# Patient Record
Sex: MALE | Race: WHITE | Employment: UNEMPLOYED | URBAN - METROPOLITAN AREA
[De-identification: names, ages, dates, MRNs, and addresses within clinical notes are randomized per-mention and may not be internally consistent; named-entity substitution may affect disease eponyms.]

---

## 2018-11-15 ENCOUNTER — OFFICE VISIT (OUTPATIENT)
Dept: FAMILY MEDICINE CLINIC | Age: 43
End: 2018-11-15
Payer: COMMERCIAL

## 2018-11-15 VITALS
DIASTOLIC BLOOD PRESSURE: 82 MMHG | HEIGHT: 71 IN | HEART RATE: 75 BPM | WEIGHT: 166 LBS | BODY MASS INDEX: 23.24 KG/M2 | OXYGEN SATURATION: 99 % | SYSTOLIC BLOOD PRESSURE: 129 MMHG

## 2018-11-15 DIAGNOSIS — Z86.19 H/O COLD SORES: ICD-10-CM

## 2018-11-15 DIAGNOSIS — Z00.00 ROUTINE ADULT HEALTH MAINTENANCE: Primary | ICD-10-CM

## 2018-11-15 PROCEDURE — 99386 PREV VISIT NEW AGE 40-64: CPT | Performed by: FAMILY MEDICINE

## 2018-11-15 RX ORDER — VALACYCLOVIR HYDROCHLORIDE 1 G/1
TABLET, FILM COATED ORAL
Qty: 4 TABLET | Refills: 0 | Status: SHIPPED | OUTPATIENT
Start: 2018-11-15 | End: 2021-10-25 | Stop reason: SDUPTHER

## 2018-11-15 RX ORDER — ACYCLOVIR 400 MG/1
TABLET ORAL
Refills: 0 | COMMUNITY
Start: 2018-10-31 | End: 2021-10-25

## 2018-11-15 ASSESSMENT — PATIENT HEALTH QUESTIONNAIRE - PHQ9
SUM OF ALL RESPONSES TO PHQ9 QUESTIONS 1 & 2: 0
2. FEELING DOWN, DEPRESSED OR HOPELESS: 0
SUM OF ALL RESPONSES TO PHQ QUESTIONS 1-9: 0
SUM OF ALL RESPONSES TO PHQ QUESTIONS 1-9: 0
1. LITTLE INTEREST OR PLEASURE IN DOING THINGS: 0

## 2018-11-20 ENCOUNTER — NURSE ONLY (OUTPATIENT)
Dept: FAMILY MEDICINE CLINIC | Age: 43
End: 2018-11-20

## 2018-11-20 DIAGNOSIS — Z00.00 ROUTINE ADULT HEALTH MAINTENANCE: Primary | ICD-10-CM

## 2018-11-20 DIAGNOSIS — Z00.00 ROUTINE ADULT HEALTH MAINTENANCE: ICD-10-CM

## 2018-11-20 LAB
ANION GAP SERPL CALCULATED.3IONS-SCNC: 10 MMOL/L (ref 3–16)
BASOPHILS ABSOLUTE: 0 K/UL (ref 0–0.2)
BASOPHILS RELATIVE PERCENT: 1 %
BUN BLDV-MCNC: 14 MG/DL (ref 7–20)
CALCIUM SERPL-MCNC: 9.8 MG/DL (ref 8.3–10.6)
CHLORIDE BLD-SCNC: 99 MMOL/L (ref 99–110)
CHOLESTEROL, TOTAL: 159 MG/DL (ref 0–199)
CO2: 29 MMOL/L (ref 21–32)
CREAT SERPL-MCNC: 1 MG/DL (ref 0.9–1.3)
EOSINOPHILS ABSOLUTE: 0.3 K/UL (ref 0–0.6)
EOSINOPHILS RELATIVE PERCENT: 6.2 %
GFR AFRICAN AMERICAN: >60
GFR NON-AFRICAN AMERICAN: >60
GLUCOSE BLD-MCNC: 95 MG/DL (ref 70–99)
HCT VFR BLD CALC: 45.2 % (ref 40.5–52.5)
HDLC SERPL-MCNC: 64 MG/DL (ref 40–60)
HEMOGLOBIN: 14.8 G/DL (ref 13.5–17.5)
LDL CHOLESTEROL CALCULATED: 65 MG/DL
LYMPHOCYTES ABSOLUTE: 1.6 K/UL (ref 1–5.1)
LYMPHOCYTES RELATIVE PERCENT: 33.3 %
MCH RBC QN AUTO: 28.8 PG (ref 26–34)
MCHC RBC AUTO-ENTMCNC: 32.7 G/DL (ref 31–36)
MCV RBC AUTO: 88.2 FL (ref 80–100)
MONOCYTES ABSOLUTE: 0.5 K/UL (ref 0–1.3)
MONOCYTES RELATIVE PERCENT: 9.8 %
NEUTROPHILS ABSOLUTE: 2.5 K/UL (ref 1.7–7.7)
NEUTROPHILS RELATIVE PERCENT: 49.7 %
PDW BLD-RTO: 15.2 % (ref 12.4–15.4)
PLATELET # BLD: 191 K/UL (ref 135–450)
PMV BLD AUTO: 8.4 FL (ref 5–10.5)
POTASSIUM SERPL-SCNC: 5 MMOL/L (ref 3.5–5.1)
RBC # BLD: 5.12 M/UL (ref 4.2–5.9)
SODIUM BLD-SCNC: 138 MMOL/L (ref 136–145)
TRIGL SERPL-MCNC: 148 MG/DL (ref 0–150)
VLDLC SERPL CALC-MCNC: 30 MG/DL
WBC # BLD: 5 K/UL (ref 4–11)

## 2018-11-20 PROCEDURE — 36415 COLL VENOUS BLD VENIPUNCTURE: CPT | Performed by: FAMILY MEDICINE

## 2018-11-21 LAB
ESTIMATED AVERAGE GLUCOSE: 114 MG/DL
HBA1C MFR BLD: 5.6 %

## 2021-10-25 ENCOUNTER — OFFICE VISIT (OUTPATIENT)
Dept: PRIMARY CARE CLINIC | Age: 46
End: 2021-10-25
Payer: COMMERCIAL

## 2021-10-25 ENCOUNTER — HOSPITAL ENCOUNTER (OUTPATIENT)
Age: 46
Discharge: HOME | End: 2021-10-25
Payer: COMMERCIAL

## 2021-10-25 VITALS
SYSTOLIC BLOOD PRESSURE: 124 MMHG | HEART RATE: 86 BPM | WEIGHT: 171.6 LBS | OXYGEN SATURATION: 98 % | TEMPERATURE: 97.8 F | HEIGHT: 71 IN | BODY MASS INDEX: 24.02 KG/M2 | DIASTOLIC BLOOD PRESSURE: 86 MMHG

## 2021-10-25 DIAGNOSIS — Z72.0 TOBACCO ABUSE: ICD-10-CM

## 2021-10-25 DIAGNOSIS — Z11.1 VISIT FOR TB SKIN TEST: ICD-10-CM

## 2021-10-25 DIAGNOSIS — Z76.89 ENCOUNTER TO ESTABLISH CARE: ICD-10-CM

## 2021-10-25 DIAGNOSIS — Z11.4 ENCOUNTER FOR SCREENING FOR HIV: ICD-10-CM

## 2021-10-25 DIAGNOSIS — Z00.00 ANNUAL PHYSICAL EXAM: Primary | ICD-10-CM

## 2021-10-25 DIAGNOSIS — Z13.1 DIABETES MELLITUS SCREENING: ICD-10-CM

## 2021-10-25 DIAGNOSIS — Z23 NEED FOR INFLUENZA VACCINATION: ICD-10-CM

## 2021-10-25 DIAGNOSIS — Z13.220 LIPID SCREENING: ICD-10-CM

## 2021-10-25 DIAGNOSIS — Z11.59 NEED FOR HEPATITIS C SCREENING TEST: ICD-10-CM

## 2021-10-25 DIAGNOSIS — Z12.11 COLON CANCER SCREENING: ICD-10-CM

## 2021-10-25 DIAGNOSIS — Z86.19 H/O COLD SORES: ICD-10-CM

## 2021-10-25 DIAGNOSIS — M25.512 LOCALIZED PAIN OF LEFT SHOULDER JOINT: ICD-10-CM

## 2021-10-25 LAB
A/G RATIO: 1.8 (ref 1.1–2.2)
ALBUMIN SERPL-MCNC: 5.1 G/DL (ref 3.4–5)
ALP BLD-CCNC: 59 U/L (ref 40–129)
ALT SERPL-CCNC: 24 U/L (ref 10–40)
ANION GAP SERPL CALCULATED.3IONS-SCNC: 14 MMOL/L (ref 3–16)
AST SERPL-CCNC: 22 U/L (ref 15–37)
BILIRUB SERPL-MCNC: 0.8 MG/DL (ref 0–1)
BUN BLDV-MCNC: 12 MG/DL (ref 7–20)
CALCIUM SERPL-MCNC: 9.7 MG/DL (ref 8.3–10.6)
CHLORIDE BLD-SCNC: 101 MMOL/L (ref 99–110)
CHOLESTEROL, TOTAL: 186 MG/DL (ref 0–199)
CO2: 26 MMOL/L (ref 21–32)
CREAT SERPL-MCNC: 0.8 MG/DL (ref 0.9–1.3)
GFR AFRICAN AMERICAN: >60
GFR NON-AFRICAN AMERICAN: >60
GLOBULIN: 2.9 G/DL
GLUCOSE BLD-MCNC: 94 MG/DL (ref 70–99)
HDLC SERPL-MCNC: 65 MG/DL (ref 40–60)
HEPATITIS C ANTIBODY INTERPRETATION: NORMAL
LDL CHOLESTEROL CALCULATED: 79 MG/DL
POTASSIUM SERPL-SCNC: 4.8 MMOL/L (ref 3.5–5.1)
SODIUM BLD-SCNC: 141 MMOL/L (ref 136–145)
TOTAL PROTEIN: 8 G/DL (ref 6.4–8.2)
TRIGL SERPL-MCNC: 211 MG/DL (ref 0–150)
VLDLC SERPL CALC-MCNC: 42 MG/DL

## 2021-10-25 PROCEDURE — 86803 HEPATITIS C AB TEST: CPT

## 2021-10-25 PROCEDURE — 90674 CCIIV4 VAC NO PRSV 0.5 ML IM: CPT | Performed by: FAMILY MEDICINE

## 2021-10-25 PROCEDURE — 90471 IMMUNIZATION ADMIN: CPT | Performed by: FAMILY MEDICINE

## 2021-10-25 PROCEDURE — 86702 HIV-2 ANTIBODY: CPT

## 2021-10-25 PROCEDURE — 86701 HIV-1ANTIBODY: CPT

## 2021-10-25 PROCEDURE — 36415 COLL VENOUS BLD VENIPUNCTURE: CPT

## 2021-10-25 PROCEDURE — 86580 TB INTRADERMAL TEST: CPT | Performed by: FAMILY MEDICINE

## 2021-10-25 PROCEDURE — 83036 HEMOGLOBIN GLYCOSYLATED A1C: CPT

## 2021-10-25 PROCEDURE — 99386 PREV VISIT NEW AGE 40-64: CPT | Performed by: FAMILY MEDICINE

## 2021-10-25 PROCEDURE — 80053 COMPREHEN METABOLIC PANEL: CPT

## 2021-10-25 PROCEDURE — 80061 LIPID PANEL: CPT

## 2021-10-25 PROCEDURE — 87390 HIV-1 AG IA: CPT

## 2021-10-25 RX ORDER — VALACYCLOVIR HYDROCHLORIDE 1 G/1
TABLET, FILM COATED ORAL
Qty: 4 TABLET | Refills: 5 | Status: SHIPPED | OUTPATIENT
Start: 2021-10-25 | End: 2021-12-17 | Stop reason: SDUPTHER

## 2021-10-25 ASSESSMENT — ENCOUNTER SYMPTOMS
EYE DISCHARGE: 0
BACK PAIN: 0
ABDOMINAL PAIN: 0
CONSTIPATION: 0
SHORTNESS OF BREATH: 0
EYE PAIN: 0
SORE THROAT: 0
DIARRHEA: 0
RHINORRHEA: 0
BLOOD IN STOOL: 1
COLOR CHANGE: 0
WHEEZING: 0

## 2021-10-25 ASSESSMENT — PATIENT HEALTH QUESTIONNAIRE - PHQ9
SUM OF ALL RESPONSES TO PHQ9 QUESTIONS 1 & 2: 0
SUM OF ALL RESPONSES TO PHQ QUESTIONS 1-9: 0
2. FEELING DOWN, DEPRESSED OR HOPELESS: 0
SUM OF ALL RESPONSES TO PHQ QUESTIONS 1-9: 0
SUM OF ALL RESPONSES TO PHQ QUESTIONS 1-9: 0
1. LITTLE INTEREST OR PLEASURE IN DOING THINGS: 0

## 2021-10-25 NOTE — PROGRESS NOTES
Maricarmen Yepez     1975    Consultants:    Patient Care Team:  Randee Carlos, DO as PCP - General (Family Medicine)    Chief Complaint:  Chief Complaint   Patient presents with   [address]    Annual Exam    Mouth Lesions    Shoulder Pain    Nicotine Dependence    Immunizations     flu shot today, con    Colon Cancer Screening    Health Maintenance     -HPI:  Maricarmen Yepez is a 40 y.o. male  established patient who presents for [facility] care, annual exam, cold sores, shoulder pain, tobacco abuse:    -HSV1/Cold sores: takes valtrex if gets one, may get once a year    -Shoulder pain:  Bilateral but left sarai than right  Progressed over time  Located in posterior shoulder  Worse with overhead movement  Does not like to take RX medication  Avoids overhead activity  No radicular symptoms  Worse with internal rotation at times  No paresthesias  Mild to moderate severity  No weakness of upper extremity or  strength  No neck pain    -Tobacco abuse:  Restarted smoking in past few years; had quit about     Social History     Tobacco Use   Smoking Status Current Every Day Smoker    Packs/day: 0.50    Years: 20.00    Pack years: 10.00    Types: Cigarettes   Smokeless Tobacco Never Used     -HM:   Flu shot today  Discuss PCV23 in future  CRC screening referred today    Patient Active Problem List   Diagnosis    H/O cold sores     Past Medical History:    No past medical history on file. Past Surgical History:  No past surgical history on file. Home Meds:  Prior to Visit Medications    Medication Sig Taking? Authorizing Provider   acyclovir (ZOVIRAX) 400 MG tablet TK 1 T PO QID FOR 5 DAYS Yes Historical Provider, MD   valACYclovir (VALTREX) 1 g tablet 2 g twice daily for one day Yes Tim Ch MD       Allergies:  Patient has no known allergies. Family History:    No family history on file.     Social History  Social History     Socioeconomic History    Marital status:      Spouse name: Not on file    Number of children: Not on file    Years of education: Not on file    Highest education level: Not on file   Occupational History    Not on file   Tobacco Use    Smoking status: Former Smoker     Packs/day: 1.00     Years: 15.00     Pack years: 15.00     Types: Cigarettes     Quit date: 3/8/2007     Years since quittin.6    Smokeless tobacco: Never Used   Substance and Sexual Activity    Alcohol use: Not on file    Drug use: Not on file    Sexual activity: Not on file   Other Topics Concern    Not on file   Social History Narrative    Not on file     Social Determinants of Health     Financial Resource Strain:     Difficulty of Paying Living Expenses:    Food Insecurity:     Worried About Running Out of Food in the Last Year:     [address] in the Last Year:    Transportation Needs:     Lack of Transportation (Medical):      Lack of Transportation (Non-Medical):    Physical Activity:     Days of Exercise per Week:     Minutes of Exercise per Session:    Stress:     Feeling of Stress :    Social Connections:     Frequency of Communication with Friends and Family:     Frequency of Social Gatherings with Friends and Family:     Attends L[employer] Services:     Active Member of Clubs or Organizations:     Attends Club or Organization Meetings:     Marital Status:    Intimate Partner Violence:     Fear of Current or Ex-Partner:     Emotionally Abused:     Physically Abused:     Sexually Abused:          Health Maintenance Completed:  Health Maintenance   Topic Date Due    Hepatitis C screen  Never done    HIV screen  Never done    Colon cancer screen colonoscopy  Never done    Flu vaccine (1) 2021    Lipid screen  2023    DTaP/Tdap/Td vaccine (2 - Td or Tdap) 2025    COVID-19 Vaccine  Completed    Hepatitis A vaccine  Aged Out    Hepatitis B vaccine  Aged Out    Hib vaccine  Aged Out    Meningococcal (ACWY) vaccine Last 3 Encounters:   10/25/21 124/86   11/15/18 129/82   02/14/13 110/70     Physical Exam  Constitutional:       General: He is not in acute distress. Appearance: He is well-developed. HENT:      Head: Normocephalic and atraumatic. Right Ear: Tympanic membrane normal.      Left Ear: Tympanic membrane normal.      Nose: Nose normal. No rhinorrhea. Mouth/Throat:      Pharynx: Uvula midline. Eyes:      Pupils: Pupils are equal, round, and reactive to light. Neck:      Trachea: No tracheal deviation. Cardiovascular:      Rate and Rhythm: Normal rate and regular rhythm. Heart sounds: Normal heart sounds. No murmur heard. No friction rub. No gallop. Pulmonary:      Effort: Pulmonary effort is normal. No respiratory distress. Breath sounds: Normal breath sounds. No wheezing or rales. Abdominal:      General: Bowel sounds are normal. There is no distension. Palpations: Abdomen is soft. Tenderness: There is no abdominal tenderness. There is no rebound. Musculoskeletal:         General: No tenderness. Normal range of motion. Lymphadenopathy:      Cervical: No cervical adenopathy. Skin:     General: Skin is warm and dry. Findings: No erythema or rash. Neurological:      Mental Status: He is alert and oriented to person, place, and time. Cranial Nerves: No cranial nerve deficit. Deep Tendon Reflexes:      Reflex Scores:       Patellar reflexes are 2+ on the right side and 2+ on the left side. Psychiatric:         Speech: Speech normal.         Thought Content: Thought content does not include homicidal or suicidal ideation.         Shoulder Examination:  Inspection:  No rash, bruising, or skin change  Palpation:  Non tender to palpation  Range of Motion: -Flexion  90, extension 45, abduction 180, adduction 45, external rotation 45, , internal rotation 55 to L4 belt line pain elicited posteriorly  Strength:  Intact 5/5  Special Tests:   Provocative Test Positive Negative Not indicated   Cross Arm Adduction Test [] [x] []   Neer Sign [] [x] []   Caal Impingement Sign [] [x] []   Yergason test [] [x] []   OViniciuss test [] [x] []     Lab Review:  Lab Results   Component Value Date     11/20/2018    K 5.0 11/20/2018    CL 99 11/20/2018    CO2 29 11/20/2018    BUN 14 11/20/2018    CREATININE 1.0 11/20/2018    GLUCOSE 95 11/20/2018    CALCIUM 9.8 11/20/2018    LABGLOM >60 11/20/2018    GFRAA >60 11/20/2018       Lab Results   Component Value Date    CHOL 159 11/20/2018     Lab Results   Component Value Date    TRIG 148 11/20/2018     Lab Results   Component Value Date    HDL 64 (H) 11/20/2018     Lab Results   Component Value Date    LDLCALC 65 11/20/2018     Lab Results   Component Value Date    LABVLDL 30 11/20/2018     Lab Results   Component Value Date    LABA1C 5.6 11/20/2018     Lab Results   Component Value Date    .0 11/20/2018          Assessment/Plan:  Deepti Leblanc is 41 y/o male who  was seen today for establish care, annual exam, mouth lesions, shoulder pain, nicotine dependence, immunizations, colon cancer screening and health maintenance. 1. Encounter to establish care  2. Annual physical exam  -Chart/records reviewed, history and physical performed, health maintenance addressed and updated, presenting problems addressed. -Recommend 150 minutes of cardiovascular activity a week, or 10,000 to 15,000 steps a day, 2 days of weightbearing  -avoid processed/refined carbohydrates (boxed/canned/frozen/fast)  -encourage healthy protein and fat, butter, avocado, egg    3. Localized pain of left shoulder joint  -not controlled  -unclear etiology; impingement vs rotator cuff tear/arthropathy vs subacromial bursitis vs labral tear vs biceps tendonitis vs cervical radiculopathy  -exam more consistent with impingement/rotator cuff pathology  -home exercises given; consider CSI, formal PT, sports med follow up if persists    4.  H/O cold sores  -well controlled; gets rarely takes valtrex PRN on demand when has cold sore  - valACYclovir (VALTREX) 1 g tablet; 2 g twice daily for one day  Dispense: 4 tablet; Refill: 5    5. Diabetes mellitus screening  - Comprehensive Metabolic Panel; Future  - HEMOGLOBIN A1C; Future    6. Lipid screening  - Lipid Panel; Future    7. Need for hepatitis C screening test  - HEPATITIS C ANTIBODY; Future    8. Encounter for screening for HIV  - HIV Screen; Future    9. Visit for TB skin test  - Mantoux testing    10. Colon cancer screening  -referred today, discussed need for CRC screening starting at age 40 y/o  - [facility] - Darlene Fernando MD, Gastroenterology, [facility]    11. Need for influenza vaccination  - INFLUENZA, MDCK QUADV, 2 YRS AND OLDER, IM, PF, PREFILL SYR OR SDV, 0.5ML (FLUCELVAX QUADV, PF)    12. Tobacco Abuse:  Encouraged cessation. Discussed with patient treatment options, and programs to help pt quit. Pt verbalizes understanding, aware of risks of persistent tobacco usage. Health Maintenance Due:  Health Maintenance Due   Topic Date Due    Hepatitis C screen  Never done    HIV screen  Never done    Colon cancer screen colonoscopy  Never done    Flu vaccine (1) 09/01/2021      Health Maintenance:  CRC/Colonoscopy Screening: referred today  HIV Screen: (12-76 yr old, and all pregnant patients): drawn today  Hep C Screen: (18-79 yr old): drawn today  Immunizations:  Flu shot today  Consider PCV23 in future, flagged after patient admitted to tobacco abuse    Return in about 6 weeks (around 12/6/2021), or if symptoms worsen or fail to improve. EMR Dragon/transcription disclaimer:  Much of this encounter note is electronic transcription/translation of spoken language to printed texts. The electronic translation of spoken language may be erroneous, or at times, nonsensical words or phrases may be inadvertently transcribed.   Although I have reviewed the note for such errors, some may still exist.       Ellyn Yanez.  Neymar Freeman., DO  10/25/2021

## 2021-10-25 NOTE — PATIENT INSTRUCTIONS
-Get colonoscopy    -Get flu shot today    -Get tb skin test    -Recommend 150 minutes of cardiovascular activity a week, or 10,000 to 15,000 steps a day, 2 days of weightbearing  -dietary wise, try to stick to your weight x 10 in calories a day  -avoid processed/refined carbohydrates (boxed/canned/frozen/fast)  -encourage healthy protein and fat, butter, avocado, egg    -Do home exercises for shoulder    -Follow up with sports medicine if symptoms worsen or fail to improve    Patient Education        Rotator Cuff: Exercises  Introduction  Here are some examples of exercises for you to try. The exercises may be suggested for a condition or for rehabilitation. Start each exercise slowly. Ease off the exercises if you start to have pain. You will be told when to start these exercises and which ones will work best for you. How to do the exercises  Pendulum swing    If you have pain in your back, do not do this exercise. 1. Hold on to a table or the back of a chair with your good arm. Then bend forward a little and let your sore arm hang straight down. This exercise does not use the arm muscles. Rather, use your legs and your hips to create movement that makes your arm swing freely. 2. Use the movement from your hips and legs to guide the slightly swinging arm back and forth like a pendulum (or elephant trunk). Then guide it in circles that start small (about the size of a dinner plate). Make the circles a bit larger each day, as your pain allows. 3. Do this exercise for 5 minutes, 5 to 7 times each day. 4. As you have less pain, try bending over a little farther to do this exercise. This will increase the amount of movement at your shoulder. Posterior stretching exercise    1. Hold the elbow of your injured arm with your other hand. 2. Use your hand to pull your injured arm gently up and across your body. You will feel a gentle stretch across the back of your injured shoulder.   3. Hold for at least 15 to 30 seconds. Then slowly lower your arm. 4. Repeat 2 to 4 times. Up-the-back stretch    Your doctor or physical therapist may want you to wait to do this stretch until you have regained most of your range of motion and strength. You can do this stretch in different ways. Hold any of these stretches for at least 15 to 30 seconds. Repeat them 2 to 4 times. 1. Light stretch: Put your hand in your back pocket. Let it rest there to stretch your shoulder. 2. Moderate stretch: With your other hand, hold your injured arm (palm outward) behind your back by the wrist. Pull your arm up gently to stretch your shoulder. 3. Advanced stretch: Put a towel over your other shoulder. Put the hand of your injured arm behind your back. Now hold the back end of the towel. With the other hand, hold the front end of the towel in front of your body. Pull gently on the front end of the towel. This will bring your hand farther up your back to stretch your shoulder. Overhead stretch    1. Standing about an arm's length away, grasp onto a solid surface. You could use a countertop, a doorknob, or the back of a sturdy chair. 2. With your knees slightly bent, bend forward with your arms straight. Lower your upper body, and let your shoulders stretch. 3. As your shoulders are able to stretch farther, you may need to take a step or two backward. 4. Hold for at least 15 to 30 seconds. Then stand up and relax. If you had stepped back during your stretch, step forward so you can keep your hands on the solid surface. 5. Repeat 2 to 4 times. Shoulder flexion (lying down)    To make a wand for this exercise, use a piece of PVC pipe or a broom handle with the broom removed. Make the wand about a foot wider than your shoulders. 1. Lie on your back, holding a wand with both hands. Your palms should face down as you hold the wand. 2. Keeping your elbows straight, slowly raise your arms over your head.  Raise them until you feel a stretch in your shoulders, upper back, and chest.  3. Hold for 15 to 30 seconds. 4. Repeat 2 to 4 times. Shoulder rotation (lying down)    To make a wand for this exercise, use a piece of PVC pipe or a broom handle with the broom removed. Make the wand about a foot wider than your shoulders. 1. Lie on your back. Hold a wand with both hands with your elbows bent and palms up. 2. Keep your elbows close to your body, and move the wand across your body toward the sore arm. 3. Hold for 8 to 12 seconds. 4. Repeat 2 to 4 times. Wall climbing (to the side)    Avoid any movement that is straight to your side, and be careful not to arch your back. Your arm should stay about 30 degrees to the front of your side. 1. Stand with your side to a wall so that your fingers can just touch it at an angle about 30 degrees toward the front of your body. 2. Walk the fingers of your injured arm up the wall as high as pain permits. Try not to shrug your shoulder up toward your ear as you move your arm up. 3. Hold that position for a count of at least 15 to 20.  4. Walk your fingers back down to the starting position. 5. Repeat at least 2 to 4 times. Try to reach higher each time. Wall climbing (to the front)    During this stretching exercise, be careful not to arch your back. 1. Face a wall, and stand so your fingers can just touch it. 2. Keeping your shoulder down, walk the fingers of your injured arm up the wall as high as pain permits. (Don't shrug your shoulder up toward your ear.)  3. Hold your arm in that position for at least 15 to 30 seconds. 4. Slowly walk your fingers back down to where you started. 5. Repeat at least 2 to 4 times. Try to reach higher each time. Shoulder blade squeeze    1. Stand with your arms at your sides, and squeeze your shoulder blades together. Do not raise your shoulders up as you squeeze. 2. Hold 6 seconds. 3. Repeat 8 to 12 times. Scapular exercise: Arm reach    1. Lie flat on your back.  This exercise: Wall push-ups    This exercise is best done with your fingers somewhat turned out, rather than straight up and down. 1. Stand facing a wall, about 12 inches to 18 inches away. 2. Place your hands on the wall at shoulder height. 3. Slowly bend your elbows and bring your face to the wall. Keep your back and hips straight. 4. Push back to where you started. 5. Repeat 8 to 12 times. 6. When you can do this exercise against a wall comfortably, you can try it against a counter. You can then slowly progress to the end of a couch, then to a sturdy chair, and finally to the floor. Scapular exercise: Retraction    For this exercise, you will need elastic exercise material, such as surgical tubing or Thera-Band. 1. Put the band around a solid object at about waist level. (A bedpost will work well.) Each hand should hold an end of the band. 2. With your elbows at your sides and bent to 90 degrees, pull the band back. Your shoulder blades should move toward each other. Then move your arms back where you started. 3. Repeat 8 to 12 times. 4. If you have good range of motion in your shoulders, try this exercise with your arms lifted out to the sides. Keep your elbows at a 90-degree angle. Raise the elastic band up to about shoulder level. Pull the band back to move your shoulder blades toward each other. Then move your arms back where you started. Internal rotator strengthening exercise    1. Start by tying a piece of elastic exercise material to a doorknob. You can use surgical tubing or Thera-Band. 2. Stand or sit with your shoulder relaxed and your elbow bent 90 degrees. Your upper arm should rest comfortably against your side. Squeeze a rolled towel between your elbow and your body for comfort. This will help keep your arm at your side. 3. Hold one end of the elastic band in the hand of the painful arm. 4. Slowly rotate your forearm toward your body until it touches your belly.  Slowly move it back to where you started. 5. Keep your elbow and upper arm firmly tucked against the towel roll or at your side. 6. Repeat 8 to 12 times. External rotator strengthening exercise    1. Start by tying a piece of elastic exercise material to a doorknob. You can use surgical tubing or Thera-Band. (You may also hold one end of the band in each hand.)  2. Stand or sit with your shoulder relaxed and your elbow bent 90 degrees. Your upper arm should rest comfortably against your side. Squeeze a rolled towel between your elbow and your body for comfort. This will help keep your arm at your side. 3. Hold one end of the elastic band with the hand of the painful arm. 4. Start with your forearm across your belly. Slowly rotate the forearm out away from your body. Keep your elbow and upper arm tucked against the towel roll or the side of your body until you begin to feel tightness in your shoulder. Slowly move your arm back to where you started. 5. Repeat 8 to 12 times. Follow-up care is a key part of your treatment and safety. Be sure to make and go to all appointments, and call your doctor if you are having problems. It's also a good idea to know your test results and keep a list of the medicines you take. Where can you learn more? Go to https://[employer]. org and sign in to your [employer] account. Enter Kenroy Vines in the [facility] box to learn more about \"Rotator Cuff: Exercises. \"     If you do not have an account, please click on the \"Sign Up Now\" link. Current as of: July 1, 2021               Content Version: 13.0  © 1592-2091 [employer]. Care instructions adapted under license by [facility] ([facility]). If you have questions about a medical condition or this instruction, always ask your healthcare professional. [place] [address] any warranty or liability for your use of this information.

## 2021-10-26 LAB
ESTIMATED AVERAGE GLUCOSE: 119.8 MG/DL
HBA1C MFR BLD: 5.8 %
HIV AG/AB: NORMAL
HIV ANTIGEN: NORMAL
HIV-1 ANTIBODY: NORMAL
HIV-2 AB: NORMAL

## 2021-10-27 ENCOUNTER — NURSE ONLY (OUTPATIENT)
Dept: PRIMARY CARE CLINIC | Age: 46
End: 2021-10-27

## 2021-10-27 DIAGNOSIS — Z23 IMMUNIZATION DUE: Primary | ICD-10-CM

## 2021-12-17 ENCOUNTER — OFFICE VISIT (OUTPATIENT)
Dept: PRIMARY CARE CLINIC | Age: 46
End: 2021-12-17
Payer: COMMERCIAL

## 2021-12-17 VITALS
TEMPERATURE: 97.9 F | HEART RATE: 76 BPM | SYSTOLIC BLOOD PRESSURE: 138 MMHG | WEIGHT: 174 LBS | BODY MASS INDEX: 24.27 KG/M2 | DIASTOLIC BLOOD PRESSURE: 88 MMHG | OXYGEN SATURATION: 97 %

## 2021-12-17 DIAGNOSIS — Z00.00 ANNUAL PHYSICAL EXAM: ICD-10-CM

## 2021-12-17 DIAGNOSIS — M75.42 IMPINGEMENT SYNDROME OF LEFT SHOULDER: Primary | ICD-10-CM

## 2021-12-17 DIAGNOSIS — Z86.19 H/O COLD SORES: ICD-10-CM

## 2021-12-17 PROCEDURE — 99213 OFFICE O/P EST LOW 20 MIN: CPT | Performed by: FAMILY MEDICINE

## 2021-12-17 RX ORDER — VALACYCLOVIR HYDROCHLORIDE 1 G/1
TABLET, FILM COATED ORAL
Qty: 4 TABLET | Refills: 5 | Status: SHIPPED | OUTPATIENT
Start: 2021-12-17

## 2021-12-17 ASSESSMENT — PATIENT HEALTH QUESTIONNAIRE - PHQ9
5. POOR APPETITE OR OVEREATING: 0
2. FEELING DOWN, DEPRESSED OR HOPELESS: 0
9. THOUGHTS THAT YOU WOULD BE BETTER OFF DEAD, OR OF HURTING YOURSELF: 0
6. FEELING BAD ABOUT YOURSELF - OR THAT YOU ARE A FAILURE OR HAVE LET YOURSELF OR YOUR FAMILY DOWN: 0
SUM OF ALL RESPONSES TO PHQ QUESTIONS 1-9: 0
SUM OF ALL RESPONSES TO PHQ QUESTIONS 1-9: 0
4. FEELING TIRED OR HAVING LITTLE ENERGY: 0
7. TROUBLE CONCENTRATING ON THINGS, SUCH AS READING THE NEWSPAPER OR WATCHING TELEVISION: 0
3. TROUBLE FALLING OR STAYING ASLEEP: 0
SUM OF ALL RESPONSES TO PHQ QUESTIONS 1-9: 0
1. LITTLE INTEREST OR PLEASURE IN DOING THINGS: 0
8. MOVING OR SPEAKING SO SLOWLY THAT OTHER PEOPLE COULD HAVE NOTICED. OR THE OPPOSITE, BEING SO FIGETY OR RESTLESS THAT YOU HAVE BEEN MOVING AROUND A LOT MORE THAN USUAL: 0
10. IF YOU CHECKED OFF ANY PROBLEMS, HOW DIFFICULT HAVE THESE PROBLEMS MADE IT FOR YOU TO DO YOUR WORK, TAKE CARE OF THINGS AT HOME, OR GET ALONG WITH OTHER PEOPLE: 0
SUM OF ALL RESPONSES TO PHQ9 QUESTIONS 1 & 2: 0

## 2021-12-17 ASSESSMENT — ANXIETY QUESTIONNAIRES
5. BEING SO RESTLESS THAT IT IS HARD TO SIT STILL: 0
GAD7 TOTAL SCORE: 0
6. BECOMING EASILY ANNOYED OR IRRITABLE: 0
4. TROUBLE RELAXING: 0
1. FEELING NERVOUS, ANXIOUS, OR ON EDGE: 0
3. WORRYING TOO MUCH ABOUT DIFFERENT THINGS: 0
2. NOT BEING ABLE TO STOP OR CONTROL WORRYING: 0
IF YOU CHECKED OFF ANY PROBLEMS ON THIS QUESTIONNAIRE, HOW DIFFICULT HAVE THESE PROBLEMS MADE IT FOR YOU TO DO YOUR WORK, TAKE CARE OF THINGS AT HOME, OR GET ALONG WITH OTHER PEOPLE: NOT DIFFICULT AT ALL
7. FEELING AFRAID AS IF SOMETHING AWFUL MIGHT HAPPEN: 0

## 2021-12-17 ASSESSMENT — ENCOUNTER SYMPTOMS: COLOR CHANGE: 0

## 2021-12-17 NOTE — PROGRESS NOTES
[address] and [facility] Medicine Residency [facility]                                             [address] [facility], [facility], [address] [place] [address]        Phone: [phone]      Name:  Carlos Maria  :    1975    Consultants:   Patient Care Team:  Milton Britton. Janet Mijares., DO as PCP - General (Family Medicine)  Milton Britton. Janet Mijares., DO as PCP - [facility] Empaneled Provider    Chief Complaint:     Carlos Maria is a 40 y.o. male  who presents today for an established patient care visit with Personalized Prevention Plan Services as noted below. HPI:     42-year-old male seen in the office today for follow-up on his left shoulder pain. Patient was recently seen in our practice and given home exercise program for left shoulder pain. Patient states that he has not yet done any of the exercises and was here for my evaluation but also to see what neck steps for treatment might be. Patient states that internally rotating his arm or lifting overhead seem to make the pain worse. Patient states he used to sleep with his arms above his head but has been trying to sleep with his arms down since his last visit and states that his pain is probably been cut in half. Patient has never injured his left arm and he is right-handed. Patient Active Problem List   Diagnosis    H/O cold sores    Tobacco abuse    Localized pain of left shoulder joint         Past Medical History:    Past Medical History:   Diagnosis Date    Cold sore     Tobacco abuse        Past Surgical History:  Past Surgical History:   Procedure Laterality Date    VASECTOMY      WISDOM TOOTH EXTRACTION         Home Meds:  Prior to Visit Medications    Medication Sig Taking? Authorizing Provider   valACYclovir (VALTREX) 1 g tablet 2 g twice daily for one day Yes Cecille Briggs DO       Allergies:    Patient has no known allergies.     Family History:       Problem Relation Age of Onset    Alcohol Abuse Father     Lung Cancer Father     Other Brother     Cancer Maternal Grandmother          Health Maintenance Completed:  Health Maintenance   Topic Date Due    Pneumococcal 0-64 years Vaccine (1 of 2 - PPSV23) Never done    Colon cancer screen colonoscopy  Never done    COVID-19 Vaccine (3 - Booster for Pfizer series) 09/27/2021    A1C test (Diabetic or Prediabetic)  10/25/2022    DTaP/Tdap/Td vaccine (2 - Td or Tdap) 05/21/2025    Lipid screen  10/25/2026    Flu vaccine  Completed    Hepatitis C screen  Completed    HIV screen  Completed    Hepatitis A vaccine  Aged Out    Hepatitis B vaccine  Aged Out    Hib vaccine  Aged Out    Meningococcal (ACWY) vaccine  Aged [employer] History   Administered Date(s) Administered    COVID-19, Pfizer, PF, 30mcg/0.3mL 03/06/2021, 03/27/2021    Hepatitis B 04/30/2008    Influenza Virus Vaccine 10/15/2013, 10/15/2018    Influenza, MDCK Quadv, IM, PF (Flucelvax 2 yrs and older) 10/25/2021    PPD Test 10/25/2021    Tdap (Boostrix, Adacel) 05/21/2015         Review of Systems:  Review of Systems   Constitutional: Negative for chills, fatigue and fever. Musculoskeletal:        See hpi   Skin: Negative for color change, pallor and rash. Physical Exam:   Vitals:    12/17/21 1006   BP: 138/88   Site: Left Upper Arm   Position: Sitting   Pulse: 76   Temp: 97.9 °F (36.6 °C)   TempSrc: Temporal   SpO2: 97%   Weight: 174 lb (78.9 kg)     Body mass index is 24.27 kg/m². Wt Readings from Last 3 Encounters:   12/17/21 174 lb (78.9 kg)   10/25/21 171 lb 9.6 oz (77.8 kg)   11/15/18 166 lb (75.3 kg)       BP Readings from Last 3 Encounters:   12/17/21 138/88   10/25/21 124/86   11/15/18 129/82       Physical Exam  Constitutional:       Appearance: Normal appearance. He is normal weight. HENT:      Head: Normocephalic and atraumatic.    Pulmonary:      Effort: Pulmonary effort is normal.   Musculoskeletal: Comments: Patient with full active and passive range of motion of his left shoulder. Patient with no tenderness to palpation over the [facility] joint, AC joint, bicipital groove, or subacromial space. Patient had intact strength with empty can testing as well as external rotation testing. Patient with some pain with internal rotation testing. Patient had positive Caal and positive Neer testing. Patient had negative speeds, negative Yergason, negative Trimble's, and negative liftoff testing. Neurological:      General: No focal deficit present. Mental Status: He is alert and oriented to person, place, and time. Mental status is at baseline. Psychiatric:         Mood and Affect: Mood normal.         Behavior: Behavior normal.         Thought Content:  Thought content normal.         Judgment: Judgment normal.              Lab Review:   Hospital Outpatient Visit on 10/25/2021   Component Date Value    Cholesterol, Total 10/25/2021 186     Triglycerides 10/25/2021 211*    HDL 10/25/2021 65*    LDL Calculated 10/25/2021 79     VLDL Cholesterol Calcula* 10/25/2021 42     Hemoglobin A1C 10/25/2021 5.8     eAG 10/25/2021 119.8     Hep C Ab Interp 10/25/2021 Non-reactive     HIV Ag/Ab 10/25/2021 Non-Reactive     HIV-1 Antibody 10/25/2021 Non-Reactive     HIV ANTIGEN 10/25/2021 Non-Reactive     HIV-2 Ab 10/25/2021 Non-Reactive     Sodium 10/25/2021 141     Potassium 10/25/2021 4.8     Chloride 10/25/2021 101     CO2 10/25/2021 26     Anion Gap 10/25/2021 14     Glucose 10/25/2021 94     BUN 10/25/2021 12     CREATININE 10/25/2021 0.8*    GFR Non- 10/25/2021 >60     GFR  10/25/2021 >60     Calcium 10/25/2021 9.7     Total Protein 10/25/2021 8.0     Albumin 10/25/2021 5.1*    Albumin/Globulin Ratio 10/25/2021 1.8     Total Bilirubin 10/25/2021 0.8     Alkaline Phosphatase 10/25/2021 59     ALT 10/25/2021 24     AST 10/25/2021 22     Globulin 10/25/2021 2.9 Assessment/Plan:  Nicolas Maxwell was seen today for shoulder pain. Diagnoses and all orders for this visit:    Impingement syndrome of left shoulder  -     [facility]    H/O cold sores  -     valACYclovir (VALTREX) 1 g tablet; 2 g twice daily for one day    Annual physical exam  -     valACYclovir (VALTREX) 1 g tablet; 2 g twice daily for one day    27-year-old male with    1. Left shoulder, impingement syndrome. Differential diagnosis and usual treatment options discussed with patient in detail. Patient will be started in formal physical therapy for his left shoulder. Patient will follow up in our practice in approximately 6 weeks for consideration of corticosteroid injection or need for further imaging. Patient was agreeable to treatment plan as outlined and all questions were answered prior to discharge. Health Maintenance Due:  Health Maintenance Due   Topic Date Due    Pneumococcal 0-64 years Vaccine (1 of 2 - PPSV23) Never done    Colon cancer screen colonoscopy  Never done    COVID-19 Vaccine (3 - Booster for [place] series) 09/27/2021          Health care decision maker:  completed today by physician       Health Maintenance: (USPSTF Recommendations)  (F) Breast Cancer Screen: (40-49 (C), 50-74 biennial screening mammogram (B))  (F) Cervical Cancer Screen: (21-29 q3yr cytology alone; 30-65 q3yr cytology alone, q5yr with hrHPV alone, or q5yr cytology+hrHPV (A))  (M) Prostate Cancer Screen: (54-79 yo discuss benefits/harm, does not recommend testing PSA in men >75 yo (D):   (M) AAA Screen: (men 73-69 yo who has ever smoked (B), consider in nonsmokers if high risk):  CRC/Colonoscopy Screening: (adults 39-53 (B), 50-75 (A))  Lung Ca Screening: Annual LDCT (+smoker age 49-80, smoked within 15 years, total of 20 pack yr history (B)):  DEXA Screen: (women >65 and older, <65 if at risk/postmenopausal (B))  HIV Screen: (16-65 yr old, and all pregnant patients (A)):   Hep C Screen: (18-79 yr old (B)):  HCC Screen: (all pts with cirrhosis and high risk Hep B (US q6 mo)):  Immunizations:    RTC:  Return in about 6 weeks (around 1/28/2022) for Left shoulder pain. EMR Dragon/transcription disclaimer:  Much of this encounter note is electronic transcription/translation of spoken language to printed texts. The electronic translation of spoken language may be erroneous, or at times, nonsensical words or phrases may be inadvertently transcribed.   Although I have reviewed the note for such errors, some may still exist.

## 2022-01-13 ENCOUNTER — HOSPITAL ENCOUNTER (OUTPATIENT)
Dept: PHYSICAL THERAPY | Age: 47
Setting detail: THERAPIES SERIES
Discharge: HOME | End: 2022-01-13
Payer: COMMERCIAL

## 2022-01-13 PROCEDURE — 97110 THERAPEUTIC EXERCISES: CPT

## 2022-01-13 PROCEDURE — 97161 PT EVAL LOW COMPLEX 20 MIN: CPT

## 2022-01-13 PROCEDURE — 97530 THERAPEUTIC ACTIVITIES: CPT

## 2022-01-13 NOTE — PROGRESS NOTES
Physical Therapy  Initial Assessment  Date: 2022  Patient Name: Sherri Montez  MRN: [id number]  : 1975     Treatment Diagnosis: Shoulder Pain    Restrictions   No LATEX Allergies     Subjective   General  Chart Reviewed: Yes  Patient assessed for rehabilitation services?: Yes  Family / Caregiver Present: No  Referring Practitioner: Gena Briggs DO  Diagnosis: M75.42 (ICD-10-CM) - Impingement syndrome of left shoulder  PT Visit Information  PT Insurance Information: Aetna  Subjective  Subjective: Pt said he has been having some L shoulder pain about a year ago. No ARPITA and has been gradually getting worse. Pt said he just started a new job. Pt works for a [employer] 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 Pt said he is R handed. Pain occurs with movement. Has pain with behind his back, behind his head, fastening seat belt, picking up kids and reaching. No issues with other shoulder. No numbness or tingling. No imgaing done at this point. MD said to do physical therapy to get muscle stronger. Bothers him while driving when he drives one handed. Pain is about 3-4 when he is moving. 0/10 at rest. Pt said he has some pain when laying on side. Does not wake him up at night. Has pain with daily household chores with reaching, cleaning, and dishes. Has not tried ice or heat or medication, Rest improves pain. Pain is sharp and jarring. Pain is anterior and wraps posterior. No allergies to LATEX.      C-SSRS Triggered by Intake questionnaire (Past 2 wk assessment):   [x] No, Questionnaire did not trigger screening.   [] Yes, Patient intake triggered further evaluation      [] C-SSRS Screening completed  [] PCP notified via Plan of Care  [] Emergency services notified  Orientation  Orientation  Overall Orientation Status: Within Normal Limits    Objective     Observation/Palpation  Posture: Fair  Palpation: No TTP  Observation: Sitting: rounded shoulders, forward head posture, bilateral AGMR, very mild gaurding  Body Mechanics: elevation: decreased upward rotation of L scapula, increased UT firing. Edema: No edema note    PROM LLE (degrees)  LLE General PROM: External Rotation: 45 degrees, Flexion: 150 degrees. Internal Rotation: 80 degrees. Abduction: 110 degrees  PROM RUE (degrees)  RUE PROM: WNL  Spine  Thoracic: Thoracic hypomobilty T1-T5  Joint Mobility  ROM LUE: GHJ hypomobility    Strength RUE  Strength RUE: Exception  Comment: Middle trap 3-/5, upper trap 3-/5, serratus: 4-/5  R Shoulder Flexion: 4+/5  R Shoulder Extension: 4+/5  R Shoulder ABduction: 4+/5  R Shoulder Internal Rotation: 4+/5  R Shoulder External Rotation: 4+/5  R Elbow Flexion: 4+/5  R Forearm Pron: 4+/5  R Forearm Sup: 4+/5  R Wrist Flexion: 4+/5  R Wrist Extension: 4+/5  Strength LUE  Strength LUE: Exception  Comment: Middle trap 3-/5, upper trap 3-/5, serratus: 4-/5  L Shoulder Flexion: 4/5  L Shoulder ABduction: 4-/5  L Shoulder Internal Rotation: 4-/5 (pain)  L Shoulder External Rotation: 4-/5 (pain)  L Elbow Flexion: 4-/5 (pain)  L Elbow Extension: 4+/5  L Forearm Pron: 4+/5  L Forearm Sup: 4+/5  L Wrist Flexion: 4+/5  L Wrist Extension: 4+/5     Additional Measures  Flexibility: Pecs (moderate tightness), Lats (Moderated tightness)  Special Tests: speeds:(-) Caal Luis:(+) O'Briens:(-) Arti:(+)  ER lag:(+)  Belly Press:(+)  Other: Clonus (-),  Shelton (-), DTR: normal bilateral biceps, triceps, and brachioradialis  Sensation  Overall Sensation Status: WNL  Bed mobility  Bridging: Independent  Rolling to Left: Independent  Rolling to Right: Independent  Supine to Sit: Independent  Sit to Supine: Independent  Transfers  Sit to Stand: Independent  Stand to sit:  Independent  Bed to Chair: Independent  Stand Pivot Transfers: Independent  Squat Pivot Transfers: Independent       Assessment   Conditions Requiring Skilled Therapeutic Intervention  Body structures, Functions, Activity limitations: Decreased functional mobility ; Increased pain;Decreased posture;Decreased ADL status; Decreased ROM; Decreased strength;Decreased high-level IADLs  Assessment: pt is a 41 y/o male that presents to [facility] with a gradual increase in L shoulder pain over the last year. No ARPITA that pt can recall. Pt presents with positive impingement test (Caal-david and neers) and positive tests indicating RTC involvement (ER Lag, Belly press, and IR/ER MMT eliciting pain). Pt also presents with significant scapular weakness, decreased scapular upward rotation during elevation on L, and GHJ hypomobility. PT recommending pt perform therapy 1-2x per week for 4 weeks in order to improve impairments and progress toward goals.   Treatment Diagnosis: Shoulder Pain  Prognosis: Good  Decision Making: Low Complexity  REQUIRES PT FOLLOW UP: Yes         Plan   Plan  Times per week: 1-2x/week for 4 weeks  Times per day: Twice a day  Plan weeks: 4 weeks  Current Treatment Recommendations: Strengthening,Neuromuscular Re-education,IADL Training,Home Exercise Program,ROM,Manual Therapy - Soft Tissue Mobilization,Manual Therapy - Joint Manipulation,Modalities,Pain Management    G-Code   Quick Dash Sum Score:27/55   Quick Dash Calculated Score:  36%    Goals  Long term goals  Time Frame for Long term goals : 4 weeks  Long term goal 1: Pt will be independent and compliant with HEP  Long term goal 2: Pt will report at least 50% improvement in frequency and intensity of pain  Long term goal 3: Pt will improve gross scapular strength to at least 4/5  Long term goal 4: Pt will improve QuickDash by at least 10% to demonstrate improvment in function  Long term goal 5: Pt will be able to don/doff seate belt with no pain       Therapy Time   Individual Concurrent Group Co-treatment   Time In 1005         Time Out 1050         Minutes 45         Timed Code Treatment Minutes: 25 70 [facility], [address] [place] [address]          Outpatient Physical Therapy  Phone: [phone] Fax: [phone]     To: Christopher Magallanes DO       From: Ady Knott PT, PT     Patient: Conrado Cobos          : 1975  Diagnosis: M75.42 (ICD-10-CM) - Impingement syndrome of left shoulder  Date: 2022  Treatment Diagnosis:  L shoulder Pain     Physical Therapy Certification/Re-Certification Form  Dear Dr. Matilda Councilman Rankin, DO  The following patient has been evaluated for physical therapy services and for therapy to continue, Medicare requires monthly physician review of the treatment plan. Please review the attached evaluation and/or summary of the patient's plan of care, and verify that you agree therapy should continue by signing the attached document and sending it back to our office. Plan of Care/Treatment to date:  [x] Therapeutic Exercise   [x] Modalities:  [x] Therapeutic Activity     [] Ultrasound  [] Electrical Stimulation   [] Gait Training      [] Cervical Traction [] Lumbar Traction  [x] Neuromuscular Re-education  [] Hot/Coldpack [] Iontophoresis    [x] Instruction in HEP      Other:  [x] Manual Therapy       []                        [] Aquatic Therapy       []                      ? Frequency/Duration:  # Days per week: [] 1 day # Weeks: [] 1 week [] 5 weeks      [x] 2 days? [] 2 weeks [] 6 weeks     [] 3 days   [] 3 weeks [] 7 weeks     [] 4 days   [x] 4 weeks [] 8 weeks    Rehab Potential: [] Excellent [x] Good [] Fair  [] Poor       Electronically signed by:  Ady Knott PT, PT      If you have any questions or concerns, please don't hesitate to call.   Thank you for your referral.    Physician Signature:________________________________Date:__________________  By signing above, therapists plan is approved by physician

## 2022-01-13 NOTE — FLOWSHEET NOTE
[facility], [facility], [facility] [address]  Phone: [phone]  Fax [phone]    Physical Therapy Daily Treatment Note    Date:  2022    Patient Name:  Scherrie Dakin    :  1975  MRN: [id number]  Restrictions/Precautions:    Medical/Treatment Diagnosis Information:  · Diagnosis: M75.42 (ICD-10-CM) - Impingement syndrome of left shoulder  Insurance/Certification information:  PT Insurance Information: Aetna  Physician Information:  Referring Practitioner: Arvind Lopez DO  Plan of care signed (Y/N):  Y  Visit# / total visits:       G-Code (if applicable):            Quick Dash Sum Score:    Dondra Hair Calculated Score: 36%    Time in:   10:05      Timed Treatment: 25 Total Treatment Time:  45  Time out: 10:50  ________________________________________________________________________________________    Pain Level:    /10  SUBJECTIVE:  See Initial Evaluation     OBJECTIVE:     Exercise/Equipment Resistance/Repetitions Other comments     Cane press +  Cane flexion x10  x10      Horizontal abduction x10 yellow     SL ER x10      Prone T x10 Palm down      Bent over row x10                                                                                                           Other Therapeutic Activities: Pt was educated on diagnosis, involved anatomy using models,HEP,  POC, established goals, and facility policies  Total: 10 min    Manual Treatments:         Modalities:      Test/Measurements: See Initial Evaluation         ASSESSMENT:  See Initial Evaluation        Treatment/Activity Tolerance:   []Patient tolerated treatment well [] Patient limited by fatique  []Patient limited by pain [] Patient limited by other medical complications  [] Other:     Goals:        Long term goals  Time Frame for Long term goals : 4 weeks  Long term goal 1: Pt will be independent and compliant with HEP  Long term goal 2: Pt will report at least 50% improvement in frequency and intensity of pain  Long term goal 3: Pt will improve gross scapular strength to at least 4/5  Long term goal 4: Pt will improve QuickDash by at least 10% to demonstrate improvment in function  Long term goal 5: Pt will be able to don/doff seate belt with no pain     Plan: [] Continue per plan of care [] Alter current plan (see comments)   [] Plan of care initiated [] Hold pending MD visit [] Discharge      Plan for Next Session:      Re-Certification Due Date:         Signature:   Antonina Putnam, PT

## 2022-02-01 ENCOUNTER — HOSPITAL ENCOUNTER (OUTPATIENT)
Dept: PHYSICAL THERAPY | Age: 47
Setting detail: THERAPIES SERIES
Discharge: HOME | End: 2022-02-01

## 2022-02-01 NOTE — PROGRESS NOTES
Physical Therapy        Physical Therapy  Cancellation/No-show Note  Patient Name:  Jacinda Barrios  :  1975   Date:  2022  Cancels to date: 1  No-shows to date: 0    For today's appointment patient:  [x] Cancelled  [] Rescheduled appointment  [] No-show     Reason given by patient:  [] Patient ill  [] Conflicting appointment  [] No transportation    [] Conflict with work  [] No reason given  [x] Other:     Comments:   Pt called and reported that his kid is suck.  Unsure if it is COVID and does not want to bring it in to office     Electronically signed by:  Luisito Vargas PT

## 2025-04-15 ENCOUNTER — OFFICE VISIT (OUTPATIENT)
Dept: FAMILY MEDICINE CLINIC | Age: 50
End: 2025-04-15
Payer: COMMERCIAL

## 2025-04-15 ENCOUNTER — TELEPHONE (OUTPATIENT)
Dept: FAMILY MEDICINE CLINIC | Age: 50
End: 2025-04-15

## 2025-04-15 VITALS
SYSTOLIC BLOOD PRESSURE: 104 MMHG | OXYGEN SATURATION: 95 % | BODY MASS INDEX: 25.06 KG/M2 | HEART RATE: 60 BPM | DIASTOLIC BLOOD PRESSURE: 80 MMHG | HEIGHT: 71 IN | WEIGHT: 179 LBS

## 2025-04-15 DIAGNOSIS — Z13.1 SCREENING FOR DIABETES MELLITUS (DM): ICD-10-CM

## 2025-04-15 DIAGNOSIS — Z76.89 ENCOUNTER TO ESTABLISH CARE: Primary | ICD-10-CM

## 2025-04-15 DIAGNOSIS — Z86.19 H/O COLD SORES: ICD-10-CM

## 2025-04-15 DIAGNOSIS — E78.49 FAMILIAL HYPERLIPIDEMIA: ICD-10-CM

## 2025-04-15 DIAGNOSIS — Z13.220 SCREENING FOR HYPERLIPIDEMIA: ICD-10-CM

## 2025-04-15 LAB
ALBUMIN SERPL-MCNC: 4.5 G/DL (ref 3.4–5)
ALBUMIN/GLOB SERPL: 1.7 {RATIO} (ref 1.1–2.2)
ALP SERPL-CCNC: 64 U/L (ref 40–129)
ALT SERPL-CCNC: 36 U/L (ref 10–40)
ANION GAP SERPL CALCULATED.3IONS-SCNC: 11 MMOL/L (ref 3–16)
AST SERPL-CCNC: 25 U/L (ref 15–37)
BILIRUB SERPL-MCNC: 0.6 MG/DL (ref 0–1)
BUN SERPL-MCNC: 20 MG/DL (ref 7–20)
CALCIUM SERPL-MCNC: 9.1 MG/DL (ref 8.3–10.6)
CHLORIDE SERPL-SCNC: 103 MMOL/L (ref 99–110)
CHOLEST SERPL-MCNC: 193 MG/DL (ref 0–199)
CO2 SERPL-SCNC: 26 MMOL/L (ref 21–32)
CREAT SERPL-MCNC: 0.9 MG/DL (ref 0.9–1.3)
GFR SERPLBLD CREATININE-BSD FMLA CKD-EPI: >90 ML/MIN/{1.73_M2}
GLUCOSE SERPL-MCNC: 93 MG/DL (ref 70–99)
HDLC SERPL-MCNC: 60 MG/DL (ref 40–60)
LDLC SERPL CALC-MCNC: 106 MG/DL
POTASSIUM SERPL-SCNC: 4.4 MMOL/L (ref 3.5–5.1)
PROT SERPL-MCNC: 7.1 G/DL (ref 6.4–8.2)
SODIUM SERPL-SCNC: 140 MMOL/L (ref 136–145)
TRIGL SERPL-MCNC: 137 MG/DL (ref 0–150)
VLDLC SERPL CALC-MCNC: 27 MG/DL

## 2025-04-15 PROCEDURE — 99203 OFFICE O/P NEW LOW 30 MIN: CPT | Performed by: STUDENT IN AN ORGANIZED HEALTH CARE EDUCATION/TRAINING PROGRAM

## 2025-04-15 RX ORDER — VALACYCLOVIR HYDROCHLORIDE 1 G/1
TABLET, FILM COATED ORAL
Qty: 4 TABLET | Refills: 5 | Status: SHIPPED | OUTPATIENT
Start: 2025-04-15

## 2025-04-15 SDOH — ECONOMIC STABILITY: FOOD INSECURITY: WITHIN THE PAST 12 MONTHS, THE FOOD YOU BOUGHT JUST DIDN'T LAST AND YOU DIDN'T HAVE MONEY TO GET MORE.: NEVER TRUE

## 2025-04-15 SDOH — ECONOMIC STABILITY: FOOD INSECURITY: WITHIN THE PAST 12 MONTHS, YOU WORRIED THAT YOUR FOOD WOULD RUN OUT BEFORE YOU GOT MONEY TO BUY MORE.: NEVER TRUE

## 2025-04-15 ASSESSMENT — ENCOUNTER SYMPTOMS
DIARRHEA: 0
CONSTIPATION: 0
SHORTNESS OF BREATH: 0
COUGH: 0

## 2025-04-15 ASSESSMENT — PATIENT HEALTH QUESTIONNAIRE - PHQ9
SUM OF ALL RESPONSES TO PHQ QUESTIONS 1-9: 0
1. LITTLE INTEREST OR PLEASURE IN DOING THINGS: NOT AT ALL
SUM OF ALL RESPONSES TO PHQ QUESTIONS 1-9: 0
2. FEELING DOWN, DEPRESSED OR HOPELESS: NOT AT ALL
SUM OF ALL RESPONSES TO PHQ QUESTIONS 1-9: 0
SUM OF ALL RESPONSES TO PHQ QUESTIONS 1-9: 0

## 2025-04-15 NOTE — PROGRESS NOTES
[facility] Medicine  [address],  [place] [address]    Assessment/Plan:    Leonel was seen today for establish care.    Diagnoses and all orders for this visit:    Encounter to establish care    H/O cold sores  -     valACYclovir (VALTREX) 1 g tablet; 2 g twice daily for one day    Familial hyperlipidemia  -     LIPOPROTEIN A (LPA); Future  -     Apolipoprotein B-100; Future    Screening for diabetes mellitus (DM)  -     Hemoglobin A1C; Future    Screening for hyperlipidemia  -     Comprehensive Metabolic Panel; Future  -     Lipid Panel; Future      Return in about 1 month (around 5/15/2025) for Physical.     Patient: Leonel Christopher is a pleasant 49 y.o.male who presents today for:      Chief Complaint   Patient presents with    Establish Care       HPI:     Establishing care:  Prior PCP was last seen 2018 ([facility]). Discovered [facility] via med device rep.  Patient has been followed by n/a specialists. Reports n/a hospitalizations. Reports childhood laceration ED visits.     Patient reports familial hypercholesterolemia and brother.    Patient's pertinent past medical history and medications were reviewed and updated as appropriate today.    Review of Systems:  Review of Systems   Constitutional:  Negative for fatigue and fever.   HENT:  Negative for congestion.    Respiratory:  Negative for cough and shortness of breath.    Cardiovascular:  Negative for leg swelling.   Gastrointestinal:  Negative for constipation and diarrhea.   Genitourinary:  Negative for dysuria.   Neurological:  Negative for headaches.   Psychiatric/Behavioral:  Negative for sleep disturbance. The patient is not nervous/anxious.        Objective:    Physical Exam:   Vitals:    04/15/25 0840   BP: 104/80   Pulse: 60   SpO2: 95%   Weight: 81.2 kg (179 lb)   Height: 1.803 m (5' 11\")       Physical Exam  HENT:      Head: Normocephalic.   Eyes:      Pupils: Pupils are equal, round, and reactive

## 2025-04-16 ENCOUNTER — RESULTS FOLLOW-UP (OUTPATIENT)
Dept: FAMILY MEDICINE CLINIC | Age: 50
End: 2025-04-16

## 2025-04-16 LAB
EST. AVERAGE GLUCOSE BLD GHB EST-MCNC: 111.2 MG/DL
HBA1C MFR BLD: 5.5 %

## 2025-04-17 LAB
APO B100 SERPL-MCNC: 62 MG/DL (ref 66–133)
LPA SERPL-MCNC: <6 MG/DL